# Patient Record
Sex: FEMALE | Race: BLACK OR AFRICAN AMERICAN | NOT HISPANIC OR LATINO | URBAN - METROPOLITAN AREA
[De-identification: names, ages, dates, MRNs, and addresses within clinical notes are randomized per-mention and may not be internally consistent; named-entity substitution may affect disease eponyms.]

---

## 2018-01-15 NOTE — MISCELLANEOUS
Message   Date: 05 Feb 2016 11:11 AM EST, Recorded By: Ross Resendiz For: Taran Olmstead: Yonatan Shaver, [place]   Phone: [phone] (Home)   Reason: Medical Complaint   Patient called requesting STD testing since discovering her fiance was unfaithful  Will schedule appointment  Also wants to start taking Zoloft as discussed with Dr Gautam Law at November appointment  Informed Pt Dr Gautam Law is out of the office  Will check with him about Rx upon his return  Active Problems    1  Encounter for cervical Pap smear with pelvic exam (V76 2,V72 31) (Z12 4)   2  Denied: History of self breast exam   3  PMS (premenstrual syndrome) (625 4) (N94 3)   4  Screening for HPV (human papillomavirus) (W83 81) (Z11 51)    Current Meds   1  Lexapro 10 MG Oral Tablet (Escitalopram Oxalate); Therapy: (Recorded:[id number]) to Recorded    Allergies    1   No Known Drug Allergies    Signatures   Electronically signed by : Vicki Callejas, ; Feb 5 2016 11:37AM EST                       (Author)

## 2018-01-17 NOTE — MISCELLANEOUS
Message   Recorded as Task   Date: 02/05/2016 11:39 AM, Created By: Brigid Dooley   Task Name: Follow Up   Assigned To: Tyra Holland   Regarding Patient: Griselda Funez, Status: In Progress   CommentCyrus Odonnell - 05 Feb 2016 11:39 AM     TASK CREATED  Patient called for Rx Zoloft as discussed with you @ appointment in November  Has appointment scheduled for 2/15/16 for STD testing but would like to start Zoloft before then  Manuel Mcgovern - 11 Feb 2016 7:02 AM     TASK REPLIED TO: Previously Assigned To Manuel Mcgovern  start Zoloft 50mg daily x 30 days, 1 refill  Thanks   Brigid Dooley - 11 Feb 2016 7:57 AM     TASK IN PROGRESS    Pt informed, escript sent  Active Problems    1  Encounter for cervical Pap smear with pelvic exam (V76 2,V72 31) (Z12 4)   2  Denied: History of self breast exam   3  PMS (premenstrual syndrome) (625 4) (N94 3)   4  Screening for HPV (human papillomavirus) (V73 81) (Z11 51)    Current Meds   1  Lexapro 10 MG Oral Tablet (Escitalopram Oxalate); Therapy: (Recorded:[id number]) to Recorded    Allergies    1   No Known Drug Allergies    Plan  PMS (premenstrual syndrome)    · Sertraline HCl - 50 MG Oral Tablet (Zoloft); TAKE 1 TABLET DAILY    Signatures   Electronically signed by : Vipul Hogue, ; Feb 11 2016  7:59AM EST                       (Author)

## 2022-09-18 ENCOUNTER — EMERGENCY (EMERGENCY)
Facility: HOSPITAL | Age: 42
LOS: 1 days | Discharge: HOME | End: 2022-09-18
Attending: EMERGENCY MEDICINE | Admitting: EMERGENCY MEDICINE
Payer: COMMERCIAL

## 2022-09-18 VITALS
HEART RATE: 78 BPM | TEMPERATURE: 98 F | SYSTOLIC BLOOD PRESSURE: 118 MMHG | RESPIRATION RATE: 18 BRPM | WEIGHT: 138.89 LBS | OXYGEN SATURATION: 99 % | DIASTOLIC BLOOD PRESSURE: 76 MMHG | HEIGHT: 66 IN

## 2022-09-18 VITALS
RESPIRATION RATE: 17 BRPM | SYSTOLIC BLOOD PRESSURE: 112 MMHG | OXYGEN SATURATION: 97 % | HEART RATE: 71 BPM | TEMPERATURE: 98 F | DIASTOLIC BLOOD PRESSURE: 73 MMHG

## 2022-09-18 LAB
ALBUMIN SERPL ELPH-MCNC: 4.4 G/DL — SIGNIFICANT CHANGE UP (ref 3.3–5)
ALP SERPL-CCNC: 51 U/L — SIGNIFICANT CHANGE UP (ref 40–120)
ALT FLD-CCNC: 8 U/L — LOW (ref 10–45)
ANION GAP SERPL CALC-SCNC: 15 MMOL/L — SIGNIFICANT CHANGE UP (ref 5–17)
APPEARANCE UR: CLEAR — SIGNIFICANT CHANGE UP
AST SERPL-CCNC: 16 U/L — SIGNIFICANT CHANGE UP (ref 10–40)
BACTERIA # UR AUTO: PRESENT /HPF
BASOPHILS # BLD AUTO: 0.02 K/UL — SIGNIFICANT CHANGE UP (ref 0–0.2)
BASOPHILS NFR BLD AUTO: 0.4 % — SIGNIFICANT CHANGE UP (ref 0–2)
BILIRUB SERPL-MCNC: 0.4 MG/DL — SIGNIFICANT CHANGE UP (ref 0.2–1.2)
BILIRUB UR-MCNC: NEGATIVE — SIGNIFICANT CHANGE UP
BLD GP AB SCN SERPL QL: NEGATIVE — SIGNIFICANT CHANGE UP
BUN SERPL-MCNC: 5 MG/DL — LOW (ref 7–23)
CALCIUM SERPL-MCNC: 9.2 MG/DL — SIGNIFICANT CHANGE UP (ref 8.4–10.5)
CHLORIDE SERPL-SCNC: 102 MMOL/L — SIGNIFICANT CHANGE UP (ref 96–108)
CO2 SERPL-SCNC: 18 MMOL/L — LOW (ref 22–31)
COLOR SPEC: YELLOW — SIGNIFICANT CHANGE UP
CREAT SERPL-MCNC: 0.66 MG/DL — SIGNIFICANT CHANGE UP (ref 0.5–1.3)
DIFF PNL FLD: NEGATIVE — SIGNIFICANT CHANGE UP
EGFR: 113 ML/MIN/1.73M2 — SIGNIFICANT CHANGE UP
EOSINOPHIL # BLD AUTO: 0.03 K/UL — SIGNIFICANT CHANGE UP (ref 0–0.5)
EOSINOPHIL NFR BLD AUTO: 0.5 % — SIGNIFICANT CHANGE UP (ref 0–6)
EPI CELLS # UR: SIGNIFICANT CHANGE UP /HPF (ref 0–5)
GLUCOSE SERPL-MCNC: 88 MG/DL — SIGNIFICANT CHANGE UP (ref 70–99)
GLUCOSE UR QL: NEGATIVE — SIGNIFICANT CHANGE UP
HCG SERPL-ACNC: HIGH MIU/ML
HCT VFR BLD CALC: 35.9 % — SIGNIFICANT CHANGE UP (ref 34.5–45)
HGB BLD-MCNC: 12.1 G/DL — SIGNIFICANT CHANGE UP (ref 11.5–15.5)
IMM GRANULOCYTES NFR BLD AUTO: 0.2 % — SIGNIFICANT CHANGE UP (ref 0–0.9)
KETONES UR-MCNC: >=80 MG/DL
LEUKOCYTE ESTERASE UR-ACNC: ABNORMAL
LIDOCAIN IGE QN: 36 U/L — SIGNIFICANT CHANGE UP (ref 7–60)
LYMPHOCYTES # BLD AUTO: 1.76 K/UL — SIGNIFICANT CHANGE UP (ref 1–3.3)
LYMPHOCYTES # BLD AUTO: 30.9 % — SIGNIFICANT CHANGE UP (ref 13–44)
MCHC RBC-ENTMCNC: 29.4 PG — SIGNIFICANT CHANGE UP (ref 27–34)
MCHC RBC-ENTMCNC: 33.7 GM/DL — SIGNIFICANT CHANGE UP (ref 32–36)
MCV RBC AUTO: 87.1 FL — SIGNIFICANT CHANGE UP (ref 80–100)
MONOCYTES # BLD AUTO: 0.44 K/UL — SIGNIFICANT CHANGE UP (ref 0–0.9)
MONOCYTES NFR BLD AUTO: 7.7 % — SIGNIFICANT CHANGE UP (ref 2–14)
NEUTROPHILS # BLD AUTO: 3.43 K/UL — SIGNIFICANT CHANGE UP (ref 1.8–7.4)
NEUTROPHILS NFR BLD AUTO: 60.3 % — SIGNIFICANT CHANGE UP (ref 43–77)
NITRITE UR-MCNC: NEGATIVE — SIGNIFICANT CHANGE UP
NRBC # BLD: 0 /100 WBCS — SIGNIFICANT CHANGE UP (ref 0–0)
PH UR: 6 — SIGNIFICANT CHANGE UP (ref 5–8)
PLATELET # BLD AUTO: 205 K/UL — SIGNIFICANT CHANGE UP (ref 150–400)
POTASSIUM SERPL-MCNC: 3.8 MMOL/L — SIGNIFICANT CHANGE UP (ref 3.5–5.3)
POTASSIUM SERPL-SCNC: 3.8 MMOL/L — SIGNIFICANT CHANGE UP (ref 3.5–5.3)
PROT SERPL-MCNC: 8 G/DL — SIGNIFICANT CHANGE UP (ref 6–8.3)
PROT UR-MCNC: NEGATIVE MG/DL — SIGNIFICANT CHANGE UP
RBC # BLD: 4.12 M/UL — SIGNIFICANT CHANGE UP (ref 3.8–5.2)
RBC # FLD: 14 % — SIGNIFICANT CHANGE UP (ref 10.3–14.5)
RBC CASTS # UR COMP ASSIST: < 5 /HPF — SIGNIFICANT CHANGE UP
RH IG SCN BLD-IMP: POSITIVE — SIGNIFICANT CHANGE UP
SODIUM SERPL-SCNC: 135 MMOL/L — SIGNIFICANT CHANGE UP (ref 135–145)
SP GR SPEC: 1.02 — SIGNIFICANT CHANGE UP (ref 1–1.03)
UROBILINOGEN FLD QL: 0.2 E.U./DL — SIGNIFICANT CHANGE UP
WBC # BLD: 5.69 K/UL — SIGNIFICANT CHANGE UP (ref 3.8–10.5)
WBC # FLD AUTO: 5.69 K/UL — SIGNIFICANT CHANGE UP (ref 3.8–10.5)
WBC UR QL: < 5 /HPF — SIGNIFICANT CHANGE UP

## 2022-09-18 PROCEDURE — 99285 EMERGENCY DEPT VISIT HI MDM: CPT

## 2022-09-18 PROCEDURE — 85025 COMPLETE CBC W/AUTO DIFF WBC: CPT

## 2022-09-18 PROCEDURE — 96374 THER/PROPH/DIAG INJ IV PUSH: CPT

## 2022-09-18 PROCEDURE — 76817 TRANSVAGINAL US OBSTETRIC: CPT | Mod: 26

## 2022-09-18 PROCEDURE — 86850 RBC ANTIBODY SCREEN: CPT

## 2022-09-18 PROCEDURE — 81001 URINALYSIS AUTO W/SCOPE: CPT

## 2022-09-18 PROCEDURE — 84702 CHORIONIC GONADOTROPIN TEST: CPT

## 2022-09-18 PROCEDURE — 93005 ELECTROCARDIOGRAM TRACING: CPT

## 2022-09-18 PROCEDURE — 99285 EMERGENCY DEPT VISIT HI MDM: CPT | Mod: 25

## 2022-09-18 PROCEDURE — 76817 TRANSVAGINAL US OBSTETRIC: CPT

## 2022-09-18 PROCEDURE — 86900 BLOOD TYPING SEROLOGIC ABO: CPT

## 2022-09-18 PROCEDURE — 93010 ELECTROCARDIOGRAM REPORT: CPT

## 2022-09-18 PROCEDURE — 36415 COLL VENOUS BLD VENIPUNCTURE: CPT

## 2022-09-18 PROCEDURE — 83690 ASSAY OF LIPASE: CPT

## 2022-09-18 PROCEDURE — 80053 COMPREHEN METABOLIC PANEL: CPT

## 2022-09-18 PROCEDURE — 76801 OB US < 14 WKS SINGLE FETUS: CPT | Mod: 26

## 2022-09-18 PROCEDURE — 86901 BLOOD TYPING SEROLOGIC RH(D): CPT

## 2022-09-18 PROCEDURE — 87086 URINE CULTURE/COLONY COUNT: CPT

## 2022-09-18 PROCEDURE — 76801 OB US < 14 WKS SINGLE FETUS: CPT

## 2022-09-18 RX ORDER — NITROFURANTOIN MACROCRYSTAL 50 MG
1 CAPSULE ORAL
Qty: 10 | Refills: 0
Start: 2022-09-18 | End: 2022-09-22

## 2022-09-18 RX ORDER — SODIUM CHLORIDE 9 MG/ML
1000 INJECTION INTRAMUSCULAR; INTRAVENOUS; SUBCUTANEOUS ONCE
Refills: 0 | Status: COMPLETED | OUTPATIENT
Start: 2022-09-18 | End: 2022-09-18

## 2022-09-18 RX ORDER — ONDANSETRON 8 MG/1
1 TABLET, FILM COATED ORAL
Qty: 16 | Refills: 0
Start: 2022-09-18

## 2022-09-18 RX ORDER — ONDANSETRON 8 MG/1
4 TABLET, FILM COATED ORAL ONCE
Refills: 0 | Status: COMPLETED | OUTPATIENT
Start: 2022-09-18 | End: 2022-09-18

## 2022-09-18 RX ADMIN — SODIUM CHLORIDE 1000 MILLILITER(S): 9 INJECTION INTRAMUSCULAR; INTRAVENOUS; SUBCUTANEOUS at 16:14

## 2022-09-18 RX ADMIN — ONDANSETRON 4 MILLIGRAM(S): 8 TABLET, FILM COATED ORAL at 16:14

## 2022-09-18 NOTE — ED PROVIDER NOTE - CARE PLAN
1 Principal Discharge DX:	Abdominal pain during pregnancy  Secondary Diagnosis:	Vomiting  Secondary Diagnosis:	Back pain  Secondary Diagnosis:	Paresthesia   Principal Discharge DX:	Abdominal pain during pregnancy  Secondary Diagnosis:	Vomiting  Secondary Diagnosis:	Back pain  Secondary Diagnosis:	Paresthesia  Secondary Diagnosis:	Subchorionic hematoma

## 2022-09-18 NOTE — ED PROVIDER NOTE - NSFOLLOWUPINSTRUCTIONS_ED_ALL_ED_FT
For nausea and vomiting: Take 10mg of pyridoxine and 10mg of doxylamine at bedtime. If that does not work increase to 20mg of each medicine on a daily basis at bedtime; if symptoms not adequately controlled, increase dose to 40mg tablets each day (10mg of each pill in AM, 10mg of each pill in the mid-afternoon, and 20mg of each pill at bedtime)     Stay well hydrated.    Return for worsening abdominal pain, vaginal bleeding, fevers, persistent vomit, worsening breathing, worsening lightheaded.    Follow up with OBGYN. Can also call [phone] to schedule appointment.     Hyperemesis Gravidarum    Hyperemesis gravidarum is a severe form of nausea and vomiting that happens during pregnancy. Hyperemesis is worse than morning sickness. It may cause you to have nausea or vomiting all day for many days. It may keep you from eating and drinking enough food and liquids, which can lead to dehydration, malnutrition, and weight loss. Hyperemesis usually occurs during the first half (the first 20 weeks) of pregnancy. It often goes away once a woman is in her second half of pregnancy. However, sometimes hyperemesis continues through an entire pregnancy.    What are the causes?  The cause of this condition is not known. It may be related to changes in chemicals (hormones) in the body during pregnancy, such as the high level of pregnancy hormone (human chorionic gonadotropin) or the increase in the female sex hormone (estrogen).    What are the signs or symptoms?  Symptoms of this condition include:  Nausea that does not go away. Vomiting that does not allow you to keep any food down. Weight loss. Body fluid loss (dehydration). Having no desire to eat, or not liking food that you have previously enjoyed.    How is this diagnosed?  This condition may be diagnosed based on:  A physical exam. Your medical history. Your symptoms. Blood tests. Urine tests.    How is this treated?  This condition is managed by controlling symptoms. This may include:  Following an eating plan. This can help lessen nausea and vomiting. Taking prescription medicines. An eating plan and medicines are often used together to help control symptoms. If medicines do not help relieve nausea and vomiting, you may need to receive fluids through an IV at the hospital.    Follow these instructions at home:    Eating and drinking     Avoid the following:  Drinking fluids with meals. Try not to drink anything during the 30 minutes before and after your meals. Drinking more than 1 cup of fluid at a time. Eating foods that trigger your symptoms. These may include spicy foods, coffee, high-fat foods, very sweet foods, and acidic foods. Skipping meals. Nausea can be more intense on an empty stomach. If you cannot tolerate food, do not force it. Try sucking on ice chips or other frozen items and make up for missed calories later. Lying down within 2 hours after eating. Being exposed to environmental triggers. These may include food smells, smoky rooms, closed spaces, rooms with strong smells, warm or humid places, overly loud and noisy rooms, and rooms with motion or flickering lights. Try eating meals in a well-ventilated area that is free of strong smells. Quick and sudden changes in your movement. Taking iron pills and multivitamins that contain iron. If you take prescription iron pills, do not stop taking them unless your health care provider approves. Preparing food. The smell of food can spoil your appetite or trigger nausea. To help relieve your symptoms:  Listen to your body. Everyone is different and has different preferences. Find what works best for you. Eat and drink slowly. Eat 5–6 small meals daily instead of 3 large meals. Eating small meals and snacks can help you avoid an empty stomach. In the morning, before getting out of bed, eat a couple of crackers to avoid moving around on an empty stomach. Try eating starchy foods as these are usually tolerated well. Examples include cereal, toast, bread, potatoes, pasta, rice, and pretzels. Include at least 1 serving of protein with your meals and snacks. Protein options include lean meats, poultry, seafood, beans, nuts, nut butters, eggs, cheese, and yogurt. Try eating a protein-rich snack before bed. Examples of a protein-sima snack include cheese and crackers or a peanut butter sandwich made with 1 slice of whole-wheat bread and 1 tsp (5 g) of peanut butter. Eat or suck on things that have ginger in them. It may help relieve nausea. Add ¼ tsp ground ginger to hot tea or choose ginger tea. Try drinking 100% fruit juice or an electrolyte drink. An electrolyte drink contains sodium, potassium, and chloride. Drink fluids that are cold, clear, and carbonated or sour. Examples include lemonade, ginger ale, lemon–lime soda, ice water, and sparkling water. Brush your teeth or use a mouth rinse after meals. Talk with your health care provider about starting a supplement of vitamin B6.    General instructions     Take over-the-counter and prescription medicines only as told by your health care provider. Follow instructions from your health care provider about eating or drinking restrictions. Continue to take your prenatal vitamins as told by your health care provider. If you are having trouble taking your prenatal vitamins, talk with your health care provider about different options. Keep all follow-up and pre-birth (prenatal) visits as told by your health care provider. This is important. Contact a health care provider if:  You have pain in your abdomen. You have a severe headache. You have vision problems. You are losing weight. You feel weak or dizzy.     Get help right away if:  You cannot drink fluids without vomiting. You vomit blood. You have constant nausea and vomiting. You are very weak. You faint. You have a fever and your symptoms suddenly get worse. For nausea and vomiting: Take 10mg of pyridoxine and 10mg of doxylamine at bedtime. If that does not work increase to 20mg of each medicine on a daily basis at bedtime; if symptoms not adequately controlled, increase dose to 40mg tablets each day (10mg of each pill in AM, 10mg of each pill in the mid-afternoon, and 20mg of each pill at bedtime)     Can also take zofran as prescribed for nausea if other meds are not helping.     Stay well hydrated.    Return for worsening abdominal pain, vaginal bleeding, fevers, persistent vomit, worsening breathing, worsening lightheaded.    Follow up with OBGYN. Can also call [phone] to schedule appointment.     Hyperemesis Gravidarum    Hyperemesis gravidarum is a severe form of nausea and vomiting that happens during pregnancy. Hyperemesis is worse than morning sickness. It may cause you to have nausea or vomiting all day for many days. It may keep you from eating and drinking enough food and liquids, which can lead to dehydration, malnutrition, and weight loss. Hyperemesis usually occurs during the first half (the first 20 weeks) of pregnancy. It often goes away once a woman is in her second half of pregnancy. However, sometimes hyperemesis continues through an entire pregnancy.    What are the causes?  The cause of this condition is not known. It may be related to changes in chemicals (hormones) in the body during pregnancy, such as the high level of pregnancy hormone (human chorionic gonadotropin) or the increase in the female sex hormone (estrogen).    What are the signs or symptoms?  Symptoms of this condition include:  Nausea that does not go away. Vomiting that does not allow you to keep any food down. Weight loss. Body fluid loss (dehydration). Having no desire to eat, or not liking food that you have previously enjoyed.    How is this diagnosed?  This condition may be diagnosed based on:  A physical exam. Your medical history. Your symptoms. Blood tests. Urine tests.    How is this treated?  This condition is managed by controlling symptoms. This may include:  Following an eating plan. This can help lessen nausea and vomiting. Taking prescription medicines. An eating plan and medicines are often used together to help control symptoms. If medicines do not help relieve nausea and vomiting, you may need to receive fluids through an IV at the hospital.    Follow these instructions at home:    Eating and drinking     Avoid the following:  Drinking fluids with meals. Try not to drink anything during the 30 minutes before and after your meals. Drinking more than 1 cup of fluid at a time. Eating foods that trigger your symptoms. These may include spicy foods, coffee, high-fat foods, very sweet foods, and acidic foods. Skipping meals. Nausea can be more intense on an empty stomach. If you cannot tolerate food, do not force it. Try sucking on ice chips or other frozen items and make up for missed calories later. Lying down within 2 hours after eating. Being exposed to environmental triggers. These may include food smells, smoky rooms, closed spaces, rooms with strong smells, warm or humid places, overly loud and noisy rooms, and rooms with motion or flickering lights. Try eating meals in a well-ventilated area that is free of strong smells. Quick and sudden changes in your movement. Taking iron pills and multivitamins that contain iron. If you take prescription iron pills, do not stop taking them unless your health care provider approves. Preparing food. The smell of food can spoil your appetite or trigger nausea. To help relieve your symptoms:  Listen to your body. Everyone is different and has different preferences. Find what works best for you. Eat and drink slowly. Eat 5–6 small meals daily instead of 3 large meals. Eating small meals and snacks can help you avoid an empty stomach. In the morning, before getting out of bed, eat a couple of crackers to avoid moving around on an empty stomach. Try eating starchy foods as these are usually tolerated well. Examples include cereal, toast, bread, potatoes, pasta, rice, and pretzels. Include at least 1 serving of protein with your meals and snacks. Protein options include lean meats, poultry, seafood, beans, nuts, nut butters, eggs, cheese, and yogurt. Try eating a protein-rich snack before bed. Examples of a protein-sima snack include cheese and crackers or a peanut butter sandwich made with 1 slice of whole-wheat bread and 1 tsp (5 g) of peanut butter. Eat or suck on things that have ginger in them. It may help relieve nausea. Add ¼ tsp ground ginger to hot tea or choose ginger tea. Try drinking 100% fruit juice or an electrolyte drink. An electrolyte drink contains sodium, potassium, and chloride. Drink fluids that are cold, clear, and carbonated or sour. Examples include lemonade, ginger ale, lemon–lime soda, ice water, and sparkling water. Brush your teeth or use a mouth rinse after meals. Talk with your health care provider about starting a supplement of vitamin B6.    General instructions     Take over-the-counter and prescription medicines only as told by your health care provider. Follow instructions from your health care provider about eating or drinking restrictions. Continue to take your prenatal vitamins as told by your health care provider. If you are having trouble taking your prenatal vitamins, talk with your health care provider about different options. Keep all follow-up and pre-birth (prenatal) visits as told by your health care provider. This is important. Contact a health care provider if:  You have pain in your abdomen. You have a severe headache. You have vision problems. You are losing weight. You feel weak or dizzy.     Get help right away if:  You cannot drink fluids without vomiting. You vomit blood. You have constant nausea and vomiting. You are very weak. You faint. You have a fever and your symptoms suddenly get worse. Take antibiotics as prescribed. For nausea and vomiting: Take 10mg of pyridoxine and 10mg of doxylamine at bedtime. If that does not work increase to 20mg of each medicine on a daily basis at bedtime; if symptoms not adequately controlled, increase dose to 40mg tablets each day (10mg of each pill in AM, 10mg of each pill in the mid-afternoon, and 20mg of each pill at bedtime)     Can also take zofran as prescribed for nausea if other meds are not helping.     Stay well hydrated.    Return for worsening abdominal pain, vaginal bleeding, fevers, persistent vomit, worsening breathing, worsening lightheaded.    Follow up with OBGYN. Can also call [phone] to schedule appointment.     Hyperemesis Gravidarum    Hyperemesis gravidarum is a severe form of nausea and vomiting that happens during pregnancy. Hyperemesis is worse than morning sickness. It may cause you to have nausea or vomiting all day for many days. It may keep you from eating and drinking enough food and liquids, which can lead to dehydration, malnutrition, and weight loss. Hyperemesis usually occurs during the first half (the first 20 weeks) of pregnancy. It often goes away once a woman is in her second half of pregnancy. However, sometimes hyperemesis continues through an entire pregnancy.    What are the causes?  The cause of this condition is not known. It may be related to changes in chemicals (hormones) in the body during pregnancy, such as the high level of pregnancy hormone (human chorionic gonadotropin) or the increase in the female sex hormone (estrogen).    What are the signs or symptoms?  Symptoms of this condition include:  Nausea that does not go away. Vomiting that does not allow you to keep any food down. Weight loss. Body fluid loss (dehydration). Having no desire to eat, or not liking food that you have previously enjoyed.    How is this diagnosed?  This condition may be diagnosed based on:  A physical exam. Your medical history. Your symptoms. Blood tests. Urine tests.    How is this treated?  This condition is managed by controlling symptoms. This may include:  Following an eating plan. This can help lessen nausea and vomiting. Taking prescription medicines. An eating plan and medicines are often used together to help control symptoms. If medicines do not help relieve nausea and vomiting, you may need to receive fluids through an IV at the hospital.    Follow these instructions at home:    Eating and drinking     Avoid the following:  Drinking fluids with meals. Try not to drink anything during the 30 minutes before and after your meals. Drinking more than 1 cup of fluid at a time. Eating foods that trigger your symptoms. These may include spicy foods, coffee, high-fat foods, very sweet foods, and acidic foods. Skipping meals. Nausea can be more intense on an empty stomach. If you cannot tolerate food, do not force it. Try sucking on ice chips or other frozen items and make up for missed calories later. Lying down within 2 hours after eating. Being exposed to environmental triggers. These may include food smells, smoky rooms, closed spaces, rooms with strong smells, warm or humid places, overly loud and noisy rooms, and rooms with motion or flickering lights. Try eating meals in a well-ventilated area that is free of strong smells. Quick and sudden changes in your movement. Taking iron pills and multivitamins that contain iron. If you take prescription iron pills, do not stop taking them unless your health care provider approves. Preparing food. The smell of food can spoil your appetite or trigger nausea. To help relieve your symptoms:  Listen to your body. Everyone is different and has different preferences. Find what works best for you. Eat and drink slowly. Eat 5–6 small meals daily instead of 3 large meals. Eating small meals and snacks can help you avoid an empty stomach. In the morning, before getting out of bed, eat a couple of crackers to avoid moving around on an empty stomach. Try eating starchy foods as these are usually tolerated well. Examples include cereal, toast, bread, potatoes, pasta, rice, and pretzels. Include at least 1 serving of protein with your meals and snacks. Protein options include lean meats, poultry, seafood, beans, nuts, nut butters, eggs, cheese, and yogurt. Try eating a protein-rich snack before bed. Examples of a protein-sima snack include cheese and crackers or a peanut butter sandwich made with 1 slice of whole-wheat bread and 1 tsp (5 g) of peanut butter. Eat or suck on things that have ginger in them. It may help relieve nausea. Add ¼ tsp ground ginger to hot tea or choose ginger tea. Try drinking 100% fruit juice or an electrolyte drink. An electrolyte drink contains sodium, potassium, and chloride. Drink fluids that are cold, clear, and carbonated or sour. Examples include lemonade, ginger ale, lemon–lime soda, ice water, and sparkling water. Brush your teeth or use a mouth rinse after meals. Talk with your health care provider about starting a supplement of vitamin B6.    General instructions     Take over-the-counter and prescription medicines only as told by your health care provider. Follow instructions from your health care provider about eating or drinking restrictions. Continue to take your prenatal vitamins as told by your health care provider. If you are having trouble taking your prenatal vitamins, talk with your health care provider about different options. Keep all follow-up and pre-birth (prenatal) visits as told by your health care provider. This is important. Contact a health care provider if:  You have pain in your abdomen. You have a severe headache. You have vision problems. You are losing weight. You feel weak or dizzy.     Get help right away if:  You cannot drink fluids without vomiting. You vomit blood. You have constant nausea and vomiting. You are very weak. You faint. You have a fever and your symptoms suddenly get worse.

## 2022-09-18 NOTE — ED PROVIDER NOTE - PHYSICAL EXAMINATION
no LE edema, normal equal distal pulses, steady unassisted gait.   PERRL, EOMI, no nystagmus. CN intact. Strength 5/5. No pronator drift. Sensation intact. no saddle anesthesia. Normal speech, no dysarthria. No carotid bruits.   No spinal ttp, neck FROM. Strength 5/5. No bony ttp, FROM all extremities.

## 2022-09-18 NOTE — ED ADULT TRIAGE NOTE - OTHER COMPLAINTS
patient presents  c/o back pain radiating to L leg, L arm numbness x 7 AM, vomiting and L sided abdominal pain

## 2022-09-18 NOTE — ED ADULT NURSE NOTE - OBJECTIVE STATEMENT
Pt. a&ox4 ambulatory with steady gait  6 weeks pregnant @ present, found out 2 weeks ago, has first OB appt. scheduled this Wednesday, has yet to have first US. Pt. comes to ED c/o lower back pain that began yesterday afternoon with n/v since the same time. Pt. then woke up this am, and noticed the lower back pain had migrated and intensified to the Lower left side, and had accompanying left sided abdominal pain, lightheadedness, and tingling to the left UE. Pt. reports mildly decreased sensation when compared to right UE on exam, neuro and motor intact. Pt. denies unilateral weakness, or any other neurologic s/s. Pt. denies dizziness, cp, sob. Pt. airway patent, breathing is spontaneous and unlabored. pt. made UG to MD Narayanan.

## 2022-09-18 NOTE — ED PROVIDER NOTE - PATIENT PORTAL LINK FT
You can access the FollowMyHealth Patient Portal offered by [facility] by registering at the following website: [email or web address]. By joining [employer]’s FollowMyHealth portal, you will also be able to view your health information using other applications (apps) compatible with our system.

## 2022-09-18 NOTE — ED PROVIDER NOTE - CLINICAL SUMMARY MEDICAL DECISION MAKING FREE TEXT BOX
42F no PMH, , LMP 8/3 w/ +pregnancy test (1st OB appt in 3 days) p/w several complaints. Has 1-2d of intermittent lower abd pain, more on L side. Also ~2d of intermittent b/l lower back pain, today more on L side. Also nausea / NBNB emesis since yesterday. Also feels tingling to LUE from shoulder to elbow. Occasional diarrhea. Took tylenol ~1230 w/ some relief of pain. took dramamine/benadryl for nausea w/o relief. No other systemic symptoms.   Vitals wnl, exam as above.  ddx: Likely all related to pregnancy. Eval IUP. Clinically not CVA/cord compression.   Labs, UA, UA, IVF/symptom control, reassess.

## 2022-09-18 NOTE — ED PROVIDER NOTE - PROGRESS NOTE DETAILS
Klepfish: bicarb 18, other labs grossly wnl. US prelim showing "Single live intrauterine gestation. Small subchorionic hemorrhage. Estimated gestational age of 6 weeks and 5 days Estimated due date of  5/10/2023" Pt still at US.  Pending: UA, PO challenge/reassessment. [place]: Feeling much better. Discussed all results with patient, including any incidental radiological findings and lab abnormalities. Given copy of results and instructed to bring copy to primary doctor.   UA pending.   pending: UA, PO challenge, likely outpt f/u. tolerated po. ua + leuk, bacteria. treat w macrobid. -Dr. CARRANZA

## 2022-09-18 NOTE — ED PROVIDER NOTE - OBJECTIVE STATEMENT
42F no PMH, , LMP 8/3 w/ +pregnancy test (1st OB appt in 3 days) p/w several complaints. Has 1-2d of intermittent lower abd pain, more on L side. Also ~2d of intermittent b/l lower back pain, today more on L side. Also nausea / NBNB emesis since yesterday. Also feels tingling to LUE from shoulder to elbow. Occasional diarrhea. Took tylenol ~1230 w/ some relief of pain. took dramamine/benadryl for nausea w/o relief. No other systemic symptoms.   Denies vaginal bleeding, f/c, SOB/CP, URI symptoms, urinary complaints, rashes, focal weakness, other numbness, vision changes, HA, LE pain/swelling.

## 2022-09-20 LAB
CULTURE RESULTS: NO GROWTH — SIGNIFICANT CHANGE UP
SPECIMEN SOURCE: SIGNIFICANT CHANGE UP

## 2022-09-21 DIAGNOSIS — D72.829 ELEVATED WHITE BLOOD CELL COUNT, UNSPECIFIED: ICD-10-CM

## 2022-09-21 DIAGNOSIS — O99.891 OTHER SPECIFIED DISEASES AND CONDITIONS COMPLICATING PREGNANCY: ICD-10-CM

## 2022-09-21 DIAGNOSIS — R10.9 UNSPECIFIED ABDOMINAL PAIN: ICD-10-CM

## 2022-09-21 DIAGNOSIS — R20.2 PARESTHESIA OF SKIN: ICD-10-CM

## 2022-09-21 DIAGNOSIS — R19.7 DIARRHEA, UNSPECIFIED: ICD-10-CM

## 2022-09-21 DIAGNOSIS — M54.50 LOW BACK PAIN, UNSPECIFIED: ICD-10-CM

## 2022-09-21 DIAGNOSIS — O21.9 VOMITING OF PREGNANCY, UNSPECIFIED: ICD-10-CM

## 2022-09-21 DIAGNOSIS — O99.111 OTHER DISEASES OF THE BLOOD AND BLOOD-FORMING ORGANS AND CERTAIN DISORDERS INVOLVING THE IMMUNE MECHANISM COMPLICATING PREGNANCY, FIRST TRIMESTER: ICD-10-CM

## 2022-09-21 DIAGNOSIS — O41.8X91: ICD-10-CM

## 2022-09-21 DIAGNOSIS — Z3A.01 LESS THAN 8 WEEKS GESTATION OF PREGNANCY: ICD-10-CM

## 2024-09-05 ENCOUNTER — EMERGENCY (EMERGENCY)
Facility: HOSPITAL | Age: 44
LOS: 1 days | Discharge: HOME | End: 2024-09-05
Attending: STUDENT IN AN ORGANIZED HEALTH CARE EDUCATION/TRAINING PROGRAM | Admitting: STUDENT IN AN ORGANIZED HEALTH CARE EDUCATION/TRAINING PROGRAM
Payer: COMMERCIAL

## 2024-09-05 ENCOUNTER — OUTPATIENT (OUTPATIENT)
Dept: OUTPATIENT SERVICES | Facility: HOSPITAL | Age: 44
LOS: 1 days | End: 2024-09-05
Payer: COMMERCIAL

## 2024-09-05 VITALS
RESPIRATION RATE: 18 BRPM | HEART RATE: 89 BPM | SYSTOLIC BLOOD PRESSURE: 106 MMHG | TEMPERATURE: 98 F | DIASTOLIC BLOOD PRESSURE: 68 MMHG

## 2024-09-05 VITALS
HEART RATE: 83 BPM | RESPIRATION RATE: 16 BRPM | TEMPERATURE: 98 F | DIASTOLIC BLOOD PRESSURE: 77 MMHG | SYSTOLIC BLOOD PRESSURE: 113 MMHG | OXYGEN SATURATION: 97 %

## 2024-09-05 DIAGNOSIS — O26.899 OTHER SPECIFIED PREGNANCY RELATED CONDITIONS, UNSPECIFIED TRIMESTER: ICD-10-CM

## 2024-09-05 PROCEDURE — 99283 EMERGENCY DEPT VISIT LOW MDM: CPT

## 2024-09-05 PROCEDURE — 99282 EMERGENCY DEPT VISIT SF MDM: CPT

## 2024-09-05 PROCEDURE — 99214 OFFICE O/P EST MOD 30 MIN: CPT

## 2024-09-05 NOTE — ED ADULT TRIAGE NOTE - CHIEF COMPLAINT QUOTE
Pt , 26 weeks pregnant, presents to the ED for decreased fetal movment since yesterday. Pt states "I haven't felt anything today." PT denies any complications thus far in pregnancy.

## 2024-09-05 NOTE — OB PROVIDER TRIAGE NOTE - HISTORY OF PRESENT ILLNESS
45yo  at 26w0d presenting for reduced fetal movement. Patient reports feeling baby move substantially less starting Tuesday when she felt baby move a total of 5 times. On Wednesday baby again approximate feeling baby no more than 5 times. Today, Thursday, patient has not felt baby move at all. - LOF - VB - CTX     Ante: Spontaneous pregnancy. NIPT and anatomy scan wnl. Failed GCT, scheduled for a GTT next week. Normotensive so far in this pregnancy. GBS unknown.    OBHX:  G1 -   FW 3050g  G2 -  SAB medical  G3 -  D&E at 18 weeks for IUGR with reverse dopplers   GynHX: Reports a fibroid and ovarian cyst noted in one of the ultrasound scans during this pregnancy (records unavailable). Denies abnormal pap smear, STI/herpes.   MedHX: anxiety  Surghx: denies  Medications: Aspirin 81 qd starting week 10, clozapine PRN  Allergies: NKDA    Physical Exam:  T(C): 36.8 (24 @ 17:47), Max: 36.8 (24 @ 17:08)  HR: 89 (24 @ 17:47) (83 - 89)  BP: 106/68 (24 @ 17:47) (106/68 - 113/77)  RR: 18 (24 @ 17:47) (16 - 18)  SpO2: 97% (24 @ 17:08) (97% - 97%)    General: NAD  Pulm: no increased WOB  Abdomen: soft, gravid, nontender  Extremities: wnl   TAUS: Cephalic. Placenta anterior. Ample fetal movement. DVP 6.9  VE: Deferred    EFM: 145 bpm, appropriate for gestational age   [place]: no ctx        A/p: 45yo  at 26w0d presenting for reduced fetal movement.    -Patient appreciating several events of fetal movement during TAUS  -Fetal status is reassuring given ample fetal movement on TAUS, appropriate DVP, NST appropriate for gestational age  - Ultrasound attached in chart   - Patient counselled at bedside about kick counting at home   - Discharged with strict return precautions to come back if continuing to experience reduced fetal movement at home, or if experiencing vaginal bleeding, leakage of fluids from vagina, or contractions. Provided with  discharge instructions.    Discussed with Dr. Leland Pittman PGY3 and attending Dr. Tiny Juárez PGY1       45yo  at 26w0d presenting for reduced fetal movement. Patient reports feeling baby move substantially less starting Tuesday when she felt baby move a total of 5 times. On Wednesday baby again approximate feeling baby no more than 5 times. Today, Thursday, patient has not felt baby move at all. - LOF - VB - CTX     Ante: Spontaneous pregnancy. NIPT and anatomy scan wnl. Failed GCT, scheduled for a GTT next week. Normotensive so far in this pregnancy. GBS unknown.    OBHX:  G1 -   FW 3050g  G2 -  SAB medical  G3 -  D&E at 18 weeks for IUGR with reverse dopplers   GynHX: Reports a fibroid and ovarian cyst noted in one of the ultrasound scans during this pregnancy (records unavailable). Denies abnormal pap smear, STI/herpes.   MedHX: anxiety  Surghx: denies  Medications: Aspirin 81 qd starting week 10, clozapine PRN  Allergies: NKDA    Physical Exam:  T(C): 36.8 (24 @ 17:47), Max: 36.8 (24 @ 17:08)  HR: 89 (24 @ 17:47) (83 - 89)  BP: 106/68 (24 @ 17:47) (106/68 - 113/77)  RR: 18 (24 @ 17:47) (16 - 18)  SpO2: 97% (24 @ 17:08) (97% - 97%)    General: NAD  Pulm: no increased WOB  Abdomen: soft, gravid, nontender  Extremities: wnl   TAUS: Cephalic. Placenta anterior. Ample fetal movement. DVP 6.9  VE: Deferred    EFM: 145 bpm, mod avinash, +10x10 accel, non recurrent variable decel, appropriate for gestational age   [place]: no ctx        A/p: 45yo  at 26w0d presenting for reduced fetal movement.    -Patient appreciating several events of fetal movement during TAUS  -Fetal status is reassuring given ample fetal movement on TAUS, appropriate DVP, NST appropriate for gestational age  - Ultrasound attached in chart   - Patient counselled at bedside about kick counting at home   - Discharged with strict return precautions to come back if continuing to experience reduced fetal movement at home, or if experiencing vaginal bleeding, leakage of fluids from vagina, or contractions. Provided with  discharge instructions.    Discussed with Dr. Leland Pittman PGY3 and attending Dr. Tiny Juárez PGY1       43yo  at 26w0d presenting for reduced fetal movement. Patient reports feeling baby move substantially less starting Tuesday when she felt baby move a total of 5 times. On Wednesday baby again approximate feeling baby no more than 5 times. Today, Thursday, patient has not felt baby move at all. - LOF - VB - CTX     Ante: Spontaneous pregnancy. NIPT and anatomy scan wnl. Failed GCT, scheduled for a GTT next week. Normotensive so far in this pregnancy. GBS unknown.    OBHX:  G1 -   FW 3050g  G2 -  SAB medical  G3 -  D&E at 18 weeks for IUGR with reverse dopplers   GynHX: Reports a fibroid and ovarian cyst noted in one of the ultrasound scans during this pregnancy (records unavailable). Denies abnormal pap smear, STI/herpes.   MedHX: anxiety  Surghx: denies  Medications: Aspirin 81 qd starting week 10, clozapine PRN  Allergies: NKDA    Physical Exam:  T(C): 36.8 (24 @ 17:47), Max: 36.8 (24 @ 17:08)  HR: 89 (24 @ 17:47) (83 - 89)  BP: 106/68 (24 @ 17:47) (106/68 - 113/77)  RR: 18 (24 @ 17:47) (16 - 18)  SpO2: 97% (24 @ 17:08) (97% - 97%)    General: NAD  Pulm: no increased WOB  Abdomen: soft, gravid, nontender  Extremities: wnl   TAUS: Cephalic. Placenta anterior. Ample fetal movement. DVP 6.9  VE: Deferred    EFM: 145 bpm, mod avinash, +10x10 accel, appropriate for gestational age   [place]: no ctx        A/p: 43yo  at 26w0d presenting for reduced fetal movement.    -Patient appreciating several events of fetal movement during TAUS  -Fetal status is reassuring given ample fetal movement on TAUS, appropriate DVP, NST appropriate for gestational age  - Ultrasound attached in chart   - Patient counselled at bedside about kick counting at home   - Discharged with strict return precautions to come back if continuing to experience reduced fetal movement at home, or if experiencing vaginal bleeding, leakage of fluids from vagina, or contractions. Provided with  discharge instructions.    Discussed with Dr. Leland Pittman PGY3 and attending Dr. Tiny Juárez PGY1

## 2024-09-05 NOTE — ED PROVIDER NOTE - PATIENT PORTAL LINK FT
You can access the FollowMyHealth Patient Portal offered by [facility] by registering at the following website: [email or web address]. By joining [facility]’s FollowMyHealth portal, you will also be able to view your health information using other applications (apps) compatible with our system.

## 2024-09-05 NOTE — ED PROVIDER NOTE - PHYSICAL EXAMINATION
GENERAL:  Awake, alert and in NAD, non-toxic appearing  ENMT: Airway patent  EYES: conjunctiva clear  CARDIAC: Regular rate, regular rhythm.  Heart sounds S1, S2, no S3, S4. No murmurs, rubs or gallops.  RESPIRATORY: Breath sounds clear and equal in bilateral anterior lung fields, no wheezes/ronchi/crackles/stridor; pt breathing and speaking comfortably with no increased WOB, no accessory mm. use, no intercostal retractions, no nasal flaring  GI: abdomen soft, non-distended, non-tender, no rebound or guarding in ruq/luq/rlq/llq/epigastrium. gravid abdomen  SKIN: warm and dry, no rashes  PSYCH: awake, alert, calm and cooperative  EXTREMITIES: no ble edmea  NEURO: gcs 15

## 2024-09-05 NOTE — ED PROVIDER NOTE - NS ED ROS FT
Positive: Decreased fetal movement, nausea, shortness of breath   negative: Fever, chills, congestion, cough, chest pain, vomiting, diarrhea, abdominal pain, dysuria, hematuria, leg edema, rash

## 2024-09-05 NOTE — ED PROVIDER NOTE - CLINICAL SUMMARY MEDICAL DECISION MAKING FREE TEXT BOX
44F -0-2-1 LMP 2024 With no past medical history, no past surgical history presents with 3-day history of decreased fetal movement with no fetal movement today. On exam, Vital signs are within normal limits, gravid abdomen soft nondistended and nontender, no remarkable exam findings.      DDx: Threatened AB versus fetal demise versus  fetal distress     plan:  -  As per protocol, discussed with yellow OB/GYN resident who states patient can bypass the ER and go directly to L&D.  Charge RN aware.

## 2024-09-05 NOTE — ED PROVIDER NOTE - OBJECTIVE STATEMENT
44F -0-2-1 LMP 2024 With no past medical history, no past surgical history presents with 3-day history of decreased fetal movement with no fetal movement today.  Patient reports some associated nausea and shortness of breath throughout the pregnancy that is unchanged today.  Patient denies vaginal bleeding or vaginal discharge.    OBGYN: Dr. Mayfield [facility]

## 2024-09-07 DIAGNOSIS — D21.9 BENIGN NEOPLASM OF CONNECTIVE AND OTHER SOFT TISSUE, UNSPECIFIED: ICD-10-CM

## 2024-09-07 DIAGNOSIS — O34.82 MATERNAL CARE FOR OTHER ABNORMALITIES OF PELVIC ORGANS, SECOND TRIMESTER: ICD-10-CM

## 2024-09-07 DIAGNOSIS — Z87.59 PERSONAL HISTORY OF OTHER COMPLICATIONS OF PREGNANCY, CHILDBIRTH AND THE PUERPERIUM: ICD-10-CM

## 2024-09-07 DIAGNOSIS — O36.8120 DECREASED FETAL MOVEMENTS, SECOND TRIMESTER, NOT APPLICABLE OR UNSPECIFIED: ICD-10-CM

## 2024-09-07 DIAGNOSIS — F41.9 ANXIETY DISORDER, UNSPECIFIED: ICD-10-CM

## 2024-09-07 DIAGNOSIS — O99.891 OTHER SPECIFIED DISEASES AND CONDITIONS COMPLICATING PREGNANCY: ICD-10-CM

## 2024-09-07 DIAGNOSIS — N83.209 UNSPECIFIED OVARIAN CYST, UNSPECIFIED SIDE: ICD-10-CM

## 2024-09-07 DIAGNOSIS — O09.522 SUPERVISION OF ELDERLY MULTIGRAVIDA, SECOND TRIMESTER: ICD-10-CM

## 2024-09-07 DIAGNOSIS — O99.342 OTHER MENTAL DISORDERS COMPLICATING PREGNANCY, SECOND TRIMESTER: ICD-10-CM

## 2024-09-07 DIAGNOSIS — O09.292 SUPERVISION OF PREGNANCY WITH OTHER POOR REPRODUCTIVE OR OBSTETRIC HISTORY, SECOND TRIMESTER: ICD-10-CM

## 2024-09-07 DIAGNOSIS — Z3A.26 26 WEEKS GESTATION OF PREGNANCY: ICD-10-CM

## 2024-09-09 DIAGNOSIS — O36.8190 DECREASED FETAL MOVEMENTS, UNSPECIFIED TRIMESTER, NOT APPLICABLE OR UNSPECIFIED: ICD-10-CM

## 2024-09-09 DIAGNOSIS — Z3A.26 26 WEEKS GESTATION OF PREGNANCY: ICD-10-CM

## 2024-09-09 DIAGNOSIS — R06.02 SHORTNESS OF BREATH: ICD-10-CM

## 2024-09-09 DIAGNOSIS — R11.0 NAUSEA: ICD-10-CM

## 2024-09-09 DIAGNOSIS — Z3A.25 25 WEEKS GESTATION OF PREGNANCY: ICD-10-CM

## 2025-02-11 NOTE — ED ADULT TRIAGE NOTE - GLASGOW COMA SCALE: BEST [employer]OR RESPONSE, MLM
Per Susannah Hutchison, NP - \"Liver lesion is likely a hemangioma nothing concerning but I do recommend MRI liver in 3-6 months to further evaluate the characteristics of the hemangioma.\" Contacted patient with [place] , [place] [id number], relayed result and recommendation for MRI to monitor in 3 to 6 months. Patient verbalized understanding and is agreeable to proceed with MRI liver. Order placed, provided patient with Central Scheduling phone number.   
(M6) obeys commands